# Patient Record
Sex: FEMALE | Race: OTHER | NOT HISPANIC OR LATINO
[De-identification: names, ages, dates, MRNs, and addresses within clinical notes are randomized per-mention and may not be internally consistent; named-entity substitution may affect disease eponyms.]

---

## 2017-01-24 ENCOUNTER — APPOINTMENT (OUTPATIENT)
Dept: PULMONOLOGY | Facility: CLINIC | Age: 77
End: 2017-01-24

## 2017-01-24 VITALS
BODY MASS INDEX: 25.6 KG/M2 | OXYGEN SATURATION: 96 % | DIASTOLIC BLOOD PRESSURE: 78 MMHG | HEART RATE: 69 BPM | HEIGHT: 59 IN | WEIGHT: 127 LBS | SYSTOLIC BLOOD PRESSURE: 128 MMHG

## 2017-01-24 DIAGNOSIS — R05 COUGH: ICD-10-CM

## 2017-01-24 DIAGNOSIS — J45.909 UNSPECIFIED ASTHMA, UNCOMPLICATED: ICD-10-CM

## 2017-01-29 PROBLEM — J45.909 AIRWAY HYPERREACTIVITY: Status: ACTIVE | Noted: 2017-01-29

## 2017-07-11 ENCOUNTER — APPOINTMENT (OUTPATIENT)
Dept: PULMONOLOGY | Facility: CLINIC | Age: 77
End: 2017-07-11

## 2018-12-13 ENCOUNTER — EMERGENCY (EMERGENCY)
Facility: HOSPITAL | Age: 78
LOS: 1 days | Discharge: HOME | End: 2018-12-13
Attending: EMERGENCY MEDICINE | Admitting: EMERGENCY MEDICINE
Payer: COMMERCIAL

## 2018-12-13 VITALS
TEMPERATURE: 99 F | DIASTOLIC BLOOD PRESSURE: 73 MMHG | HEART RATE: 81 BPM | OXYGEN SATURATION: 100 % | SYSTOLIC BLOOD PRESSURE: 155 MMHG | RESPIRATION RATE: 16 BRPM

## 2018-12-13 DIAGNOSIS — Z90.49 ACQUIRED ABSENCE OF OTHER SPECIFIED PARTS OF DIGESTIVE TRACT: Chronic | ICD-10-CM

## 2018-12-13 LAB
ALBUMIN SERPL ELPH-MCNC: 4.2 G/DL — SIGNIFICANT CHANGE UP (ref 3.3–5)
ALP SERPL-CCNC: 53 U/L — SIGNIFICANT CHANGE UP (ref 40–120)
ALT FLD-CCNC: 13 U/L — SIGNIFICANT CHANGE UP (ref 4–33)
AST SERPL-CCNC: 20 U/L — SIGNIFICANT CHANGE UP (ref 4–32)
BASOPHILS # BLD AUTO: 0.07 K/UL — SIGNIFICANT CHANGE UP (ref 0–0.2)
BASOPHILS NFR BLD AUTO: 1 % — SIGNIFICANT CHANGE UP (ref 0–2)
BILIRUB SERPL-MCNC: 0.4 MG/DL — SIGNIFICANT CHANGE UP (ref 0.2–1.2)
BUN SERPL-MCNC: 8 MG/DL — SIGNIFICANT CHANGE UP (ref 7–23)
CALCIUM SERPL-MCNC: 9 MG/DL — SIGNIFICANT CHANGE UP (ref 8.4–10.5)
CHLORIDE SERPL-SCNC: 95 MMOL/L — LOW (ref 98–107)
CO2 SERPL-SCNC: 26 MMOL/L — SIGNIFICANT CHANGE UP (ref 22–31)
CREAT SERPL-MCNC: 0.58 MG/DL — SIGNIFICANT CHANGE UP (ref 0.5–1.3)
EOSINOPHIL # BLD AUTO: 0.24 K/UL — SIGNIFICANT CHANGE UP (ref 0–0.5)
EOSINOPHIL NFR BLD AUTO: 3.5 % — SIGNIFICANT CHANGE UP (ref 0–6)
GLUCOSE SERPL-MCNC: 112 MG/DL — HIGH (ref 70–99)
HCT VFR BLD CALC: 40.1 % — SIGNIFICANT CHANGE UP (ref 34.5–45)
HGB BLD-MCNC: 13.8 G/DL — SIGNIFICANT CHANGE UP (ref 11.5–15.5)
IMM GRANULOCYTES # BLD AUTO: 0.01 # — SIGNIFICANT CHANGE UP
IMM GRANULOCYTES NFR BLD AUTO: 0.1 % — SIGNIFICANT CHANGE UP (ref 0–1.5)
LYMPHOCYTES # BLD AUTO: 2.46 K/UL — SIGNIFICANT CHANGE UP (ref 1–3.3)
LYMPHOCYTES # BLD AUTO: 35.8 % — SIGNIFICANT CHANGE UP (ref 13–44)
MCHC RBC-ENTMCNC: 30.3 PG — SIGNIFICANT CHANGE UP (ref 27–34)
MCHC RBC-ENTMCNC: 34.4 % — SIGNIFICANT CHANGE UP (ref 32–36)
MCV RBC AUTO: 87.9 FL — SIGNIFICANT CHANGE UP (ref 80–100)
MONOCYTES # BLD AUTO: 0.62 K/UL — SIGNIFICANT CHANGE UP (ref 0–0.9)
MONOCYTES NFR BLD AUTO: 9 % — SIGNIFICANT CHANGE UP (ref 2–14)
NEUTROPHILS # BLD AUTO: 3.48 K/UL — SIGNIFICANT CHANGE UP (ref 1.8–7.4)
NEUTROPHILS NFR BLD AUTO: 50.6 % — SIGNIFICANT CHANGE UP (ref 43–77)
NRBC # FLD: 0 — SIGNIFICANT CHANGE UP
PLATELET # BLD AUTO: 255 K/UL — SIGNIFICANT CHANGE UP (ref 150–400)
PMV BLD: 10.2 FL — SIGNIFICANT CHANGE UP (ref 7–13)
POTASSIUM SERPL-MCNC: 4.2 MMOL/L — SIGNIFICANT CHANGE UP (ref 3.5–5.3)
POTASSIUM SERPL-SCNC: 4.2 MMOL/L — SIGNIFICANT CHANGE UP (ref 3.5–5.3)
PROT SERPL-MCNC: 7.8 G/DL — SIGNIFICANT CHANGE UP (ref 6–8.3)
RBC # BLD: 4.56 M/UL — SIGNIFICANT CHANGE UP (ref 3.8–5.2)
RBC # FLD: 11.9 % — SIGNIFICANT CHANGE UP (ref 10.3–14.5)
SODIUM SERPL-SCNC: 130 MMOL/L — LOW (ref 135–145)
WBC # BLD: 6.88 K/UL — SIGNIFICANT CHANGE UP (ref 3.8–10.5)
WBC # FLD AUTO: 6.88 K/UL — SIGNIFICANT CHANGE UP (ref 3.8–10.5)

## 2018-12-13 PROCEDURE — 99285 EMERGENCY DEPT VISIT HI MDM: CPT

## 2018-12-13 PROCEDURE — 70450 CT HEAD/BRAIN W/O DYE: CPT | Mod: 26

## 2018-12-13 NOTE — ED ADULT NURSE NOTE - OBJECTIVE STATEMENT
Patient to room 29 with c/o diplopia and headache as well as left sided face numbness. Pt sent for evaluation/MRI? by her MD. Patient waiting for further evaluation. IVL placed to right AC 20 gauge and labs drawn and sent. Pt waiting for further orders, results, test and dispo.   STANLEY Beckham

## 2018-12-13 NOTE — ED PROVIDER NOTE - CARE PROVIDER_API CALL
Ezra Ceballos (DO), Neurology; Vascular Neurology  [address] [facility] [address]  [place] [address]  Phone: [phone]  Fax: [phone]

## 2018-12-13 NOTE — ED PROVIDER NOTE - PROGRESS NOTE DETAILS
Paged neurology. Awaiting callback. Spoke with neurology. Recommend CTA head and neck and will evaluate patient in ED. Patient seen by neurology. CTA neg for acute pathology. Patient to follow up as outpatient for further testing.

## 2018-12-13 NOTE — ED PROVIDER NOTE - OBJECTIVE STATEMENT
79 yo F, accompanied by , w/ PMH of Glaucoma on Timolol, presenting at request of her ENT dt/ diplopia. 79 yo F, accompanied by , w/ PMH of Glaucoma on Timolol, presenting at request of her ENT dt/ diplopia. Patient began noticing double vision 5 days ago, only when opening her L eye.  also noticed that L eye seemed off center. This was associated with a headache, that radiated from the forehead over the crown to the occiput, as well as L facial pain. She saw her PMD one week ago, who diagnosed her with a sinus infection. Because of the double vision, she saw an ophthalmologist, who recommended she see ENT. Saw ENT today, who recommended that she go to [facility] ER for an MRI. Patient endorses intermittent n/v and dizziness over past week, as well as chronic abdominal pain, unchanged from prior. Denies other complaints.     PMD: Dr. Pham Felder  Ophtho: Dr. Gabriele Queen  ENT: Dr. Shamir Jefferson

## 2018-12-13 NOTE — ED PROVIDER NOTE - ATTENDING CONTRIBUTION TO CARE
Patient is a 79 yo F here for double vision.    gaze palsy Patient is a 77 yo F with history of glaucoma here for double vision x 5 days. Patient went to an ophthalmologist who advised her to see ENT and then Neuro. She saw Dr. Shamir Jefferson and was advised to come to the emergency department for evaluation. She c/o mild left sided headache. Double vision is with left eye. Denies fevers, chills. + nausea, + vomiting. No rash. No difficulty with walking or speaking.     VS noted  Gen. no acute distress, Non toxic   HEENT: PERRL, left eye with ? medial gaze palsy, mmm   Lungs: CTAB/L no C/ W /R   CVS: RRR   Abd; Soft non tender, non distended   Ext: no edema  Skin: no rash  Neuro AAOx3, CN 2-12 intact, strength is 5/5 in UE/LE, clear speech  a/p: left eye with medial gaze palsy - will consult Neuro. Patient is not diabetic, no prior episodes.  - Brooks VILLAVICENCIO    gaze palsy

## 2018-12-13 NOTE — ED PROVIDER NOTE - EYES, MLM
Clear bilaterally, pupils equal, round and reactive to light. L eye deviates to L at baseline. Unable to look inferior with L eye when looking Left.

## 2018-12-13 NOTE — ED ADULT NURSE REASSESSMENT NOTE - NS ED NURSE REASSESS COMMENT FT1
report received from STANLEY Finnegan, pt A&Ox3, ambulatory c/o 10/10 headache and double vision. pt endorses double vision not present when left eye is covered. right eye noted to drift to the right when left eye faces forward, family states this is not normal for patient.

## 2018-12-13 NOTE — ED PROVIDER NOTE - MEDICAL DECISION MAKING DETAILS
79 yo F presenting from ENT's office with request for MRI of brain d/t 5 days of diplopia, L eye deviation on exam, Extraocular movements of L eye not intact. Otherwise jeremy vision, but also with headache and L facial pain. Will obtain cbc, cmp, and CT head noncon. Reassess.

## 2018-12-14 VITALS
TEMPERATURE: 97 F | DIASTOLIC BLOOD PRESSURE: 63 MMHG | RESPIRATION RATE: 16 BRPM | HEART RATE: 87 BPM | SYSTOLIC BLOOD PRESSURE: 153 MMHG | OXYGEN SATURATION: 97 %

## 2018-12-14 PROCEDURE — 70498 CT ANGIOGRAPHY NECK: CPT | Mod: 26

## 2018-12-14 PROCEDURE — 70496 CT ANGIOGRAPHY HEAD: CPT | Mod: 26

## 2018-12-14 RX ORDER — ACETAMINOPHEN 500 MG
650 TABLET ORAL ONCE
Qty: 0 | Refills: 0 | Status: COMPLETED | OUTPATIENT
Start: 2018-12-14 | End: 2018-12-14

## 2018-12-14 RX ADMIN — Medication 650 MILLIGRAM(S): at 01:07

## 2018-12-14 NOTE — ED ADULT NURSE REASSESSMENT NOTE - NS ED NURSE REASSESS COMMENT FT1
pt discharged, instructions provided. IV removed. pt ambulated to ED exit without assist accompanied by .

## 2018-12-14 NOTE — CONSULT NOTE ADULT - NSHPATTENDINGPLANDISCUSS_GEN_ALL_CORE
neurology resident as above and CTA head and neck normal and will follow up with private neurologist next week.

## 2018-12-14 NOTE — CONSULT NOTE ADULT - SUBJECTIVE AND OBJECTIVE BOX
HPI:  The patient is a 77 yo with no known PMHx presented with wake up onset of binocular oblique diplopia from 2 days ago. Oblique diplopia, worse on the right gaze, persists on the left gaze but it's less prominent. Have some eye pain in and around th eye with no edema. Eye pain is more severe with eye EOMs.     MEDICATIONS  (STANDING):    MEDICATIONS  (PRN):    PAST MEDICAL & SURGICAL HISTORY:  Glaucoma  S/P cholecystectomy    FAMILY HISTORY:    Allergies    No Known Allergies    Intolerances        SHx - No smoking, No ETOH, No drug abuse    Review of Systems:  CONSTITUTIONAL:  No weight loss, fever, chills, weakness or fatigue.  HEENT:  Eyes:  No visual loss, blurred vision, double vision or yellow sclerae. Ears, Nose, Throat:  No hearing loss, sneezing, congestion, runny nose or sore throat.  CARDIOVASCULAR:  No chest pain, chest pressure or chest discomfort. No palpitations or edema.  GASTROINTESTINAL:  No anorexia, nausea, vomiting or diarrhea. No abdominal pain or blood.  NEUROLOGICAL: See HPI  MUSCULOSKELETAL:  No muscle, back pain, joint pain or stiffness.  PSYCHIATRIC:  No history of depression or anxiety.      Vital Signs Last 24 Hrs  T(C): 36.2 (14 Dec 2018 00:18), Max: 37 (13 Dec 2018 16:05)  T(F): 97.1 (14 Dec 2018 00:18), Max: 98.6 (13 Dec 2018 16:05)  HR: 87 (14 Dec 2018 00:18) (78 - 87)  BP: 153/63 (14 Dec 2018 00:18) (153/60 - 155/73)  BP(mean): --  RR: 16 (14 Dec 2018 00:18) (16 - 16)  SpO2: 97% (14 Dec 2018 00:18) (97% - 100%)    General Exam:   General appearance: No acute distress                   Neurological Exam:  Mental Status: alert, Orientated to self, date and place.    No dysarthria, aphasia or neglect.      Cranial Nerves: CN I - not tested.  PERRL  - Primary gaze, left eye extropia and hypotropia.   - Right gaze, left eye decrease adduction, right eye nystagmus  - left gaze, left eye hypotropia    No facial asymmetry.  Hearing intact to finger rub bilaterally.     Motor:   Tone: normal.                  Strength:     [] Upper extremity                      Delt       Bicep    Tricep                                                  R         5/5        5/5        5/5       5/5                                               L          5/5 5/5        5/5 5/5  [] Lower extremity                       HF          KE          KF        DF         PF                                               R        5/5 5/5 5/5 5/5 5/5                                               L         5/5 5/5 5/5 5/5        5/5  Pronator drift: none                 Dysmetria: BL NL FTN  No truncal ataxia.    Tremor: No resting, postural or action tremor.  No myoclonus.    Sensation: intact to light touch, pinprick, vibration and proprioception    Deep Tendon Reflexes:   Right 2+ : BC, TC, BRD   Left 2+ : BC, TC, BRD  Right 2+ Knee, 1+ ankle  Left 2+ Knee, 1+ ankle    Toes flexor bilaterally  Gait: normal and stable.      Other:    12-13    130<L>  |  95<L>  |  8   ----------------------------<  112<H>  4.2   |  26  |  0.58    Ca    9.0      13 Dec 2018 19:55    TPro  7.8  /  Alb  4.2  /  TBili  0.4  /  DBili  x   /  AST  20  /  ALT  13  /  AlkPhos  53  12-13                          13.8   6.88  )-----------( 255      ( 13 Dec 2018 19:55 )             40.1       Radiology    CT  MRI  EKG:  tele:  TTE:  EEG:

## 2018-12-14 NOTE — CONSULT NOTE ADULT - ASSESSMENT
77 yo with no known PMHx presented with wake up onset of binocular oblique diplopia from 2 days ago. Oblique diplopia, worse on the right gaze, persists on the left gaze but it's less prominent. Have some eye pain in and around th eye with no edema. Eye pain is more severe with eye EOMs. Exam showed left eye extropia and hypotropia and Right gaze, left eye decrease adduction, right eye nystagmus and left gaze, left eye hypotropia.  DDx: III CN palsym supranuclear disorders, ocular neuropathies are within differentials.   Impression: Since she is not diabetic. Would recommend CTA Head or MRA H to R/O pcom aneurysm. CTV or MRV brain to R/O cavernous sinus thrombosis. MRI brain with contrast and clear cuts of orbit to R/O demyelination, ocular myopathies, and III n. infiltrative diseases.  Plan:  [] MRI brain with contrast and clear cuts of orbit  [] CTA- CTV brain or MRA-MRV brain 77 yo with no known PMHx presented with wake up onset of binocular oblique diplopia from 2 days ago. Oblique diplopia, worse on the right gaze, persists on the left gaze but it's less prominent. Have some eye pain in and around th eye with no edema. Eye pain is more severe with eye EOMs. Exam showed left eye extropia and hypotropia and Right gaze, left eye decrease adduction, right eye nystagmus and left gaze, left eye hypotropia.  DDx: III CN palsym supranuclear disorders, ocular neuropathies are within differentials.   Impression: Since she is not diabetic. Would recommend CTA Head or MRA H to R/O pcom aneurysm. CTV or MRV brain to R/O cavernous sinus thrombosis. MRI brain with contrast and clear cuts of orbit to R/O demyelination, ocular myopathies, and III n. infiltrative diseases.  Plan:  [] MRI brain with contrast and clear cuts of orbit  [] CTA- CTV brain or MRA-MRV brain  [] F/U with neurologist as an outpatient Dr. Ceballos [phone] 77 yo with no known PMHx presented with wake up onset of binocular oblique diplopia from 2 days ago. Oblique diplopia, worse on the right gaze, persists on the left gaze but it's less prominent. Have some eye pain in and around th eye with no edema. Eye pain is more severe with eye EOMs. Exam showed left eye extropia and hypotropia and Right gaze, left eye decrease adduction, right eye nystagmus and left gaze, left eye hypotropia.  DDx: III CN palsym supranuclear disorders, ocular neuropathies are within differentials.   Impression: Since she is not diabetic. Would recommend CTA Head or MRA H to R/O pcom aneurysm. CTV or MRV brain to R/O cavernous sinus thrombosis. MRI brain with contrast and clear cuts of orbit to R/O demyelination, ocular myopathies, and III n. infiltrative diseases.  Plan:  [] MRI brain with contrast and clear cuts of orbit  [] CTA- CTV brain or MRA-MRV brain as outpatient   [] F/U with neurologist as an outpatient Dr. Ceballos [phone]

## 2018-12-19 PROBLEM — H40.9 UNSPECIFIED GLAUCOMA: Chronic | Status: ACTIVE | Noted: 2018-12-13

## 2019-01-02 ENCOUNTER — APPOINTMENT (OUTPATIENT)
Dept: OPHTHALMOLOGY | Facility: CLINIC | Age: 79
End: 2019-01-02
Payer: COMMERCIAL

## 2019-01-02 DIAGNOSIS — H49.12 FOURTH [TROCHLEAR] NERVE PALSY, LEFT EYE: ICD-10-CM

## 2019-01-02 DIAGNOSIS — Z86.69 PERSONAL HISTORY OF OTHER DISEASES OF THE NERVOUS SYSTEM AND SENSE ORGANS: ICD-10-CM

## 2019-01-02 PROCEDURE — 99204 OFFICE O/P NEW MOD 45 MIN: CPT

## 2019-01-02 PROCEDURE — 92060 SENSORIMOTOR EXAMINATION: CPT

## 2019-02-13 ENCOUNTER — APPOINTMENT (OUTPATIENT)
Dept: OPHTHALMOLOGY | Facility: CLINIC | Age: 79
End: 2019-02-13
Payer: MEDICARE

## 2019-02-13 PROCEDURE — 92012 INTRM OPH EXAM EST PATIENT: CPT

## 2019-02-13 PROCEDURE — 92060 SENSORIMOTOR EXAMINATION: CPT

## 2022-02-23 NOTE — ED PROVIDER NOTE - NS ED ATTENDING STATEMENT MOD
For information on Fall & Injury Prevention, visit: [email or web address] OR  [email or web address] OR  https://www.cdc.gov/steadi/patient.html I have personally seen and examined this patient.  I have fully participated in the care of this patient. I have reviewed all pertinent clinical information, including history, physical exam, plan and the Resident’s note and agree except as noted.

## 2024-04-03 ENCOUNTER — APPOINTMENT (OUTPATIENT)
Dept: NEUROLOGY | Facility: CLINIC | Age: 84
End: 2024-04-03
Payer: MEDICARE

## 2024-04-03 VITALS
HEIGHT: 59 IN | SYSTOLIC BLOOD PRESSURE: 165 MMHG | BODY MASS INDEX: 25.6 KG/M2 | WEIGHT: 127 LBS | DIASTOLIC BLOOD PRESSURE: 70 MMHG | HEART RATE: 70 BPM

## 2024-04-03 DIAGNOSIS — H49.20 SIXTH [ABDUCENT] NERVE PALSY, UNSPECIFIED EYE: ICD-10-CM

## 2024-04-03 DIAGNOSIS — Z82.49 FAMILY HISTORY OF ISCHEMIC HEART DISEASE AND OTHER DISEASES OF THE CIRCULATORY SYSTEM: ICD-10-CM

## 2024-04-03 PROCEDURE — G2211 COMPLEX E/M VISIT ADD ON: CPT

## 2024-04-03 PROCEDURE — 99205 OFFICE O/P NEW HI 60 MIN: CPT

## 2024-04-03 RX ORDER — BECLOMETHASONE DIPROPIONATE 80 UG/1
80 AEROSOL, METERED RESPIRATORY (INHALATION) TWICE DAILY
Qty: 1 | Refills: 0 | Status: DISCONTINUED | OUTPATIENT
Start: 2017-01-29 | End: 2024-04-03

## 2024-04-03 RX ORDER — ASPIRIN 81 MG
81 TABLET, DELAYED RELEASE (ENTERIC COATED) ORAL
Refills: 0 | Status: ACTIVE | COMMUNITY

## 2024-04-03 RX ORDER — OMEPRAZOLE 20 MG/1
20 TABLET, DELAYED RELEASE ORAL
Refills: 0 | Status: ACTIVE | COMMUNITY

## 2024-04-03 NOTE — DISCUSSION/SUMMARY
[FreeTextEntry1] : She has a right 6th nerve palsy and is otherwise neurologically intact.  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 Giant cell arteritis, Myasthenia Gravis and thyroid ophthalmopathy are also possible, though seemingly less likely. I have requested an MRI brain, MRA head and neck, ESR/CRP to screen for temporal arteritis, as well as acetylcholine receptor antibodies and TFTs. She should continue to follow up with Dr. Hernandez to screen for other pathology and for further workup.  She should benefit from aggressive vascular risk factor control, including BP which was elevated in the office today.  She can follow up in 2 months. I hope she remains free of further serious trouble.

## 2024-04-03 NOTE — HISTORY OF PRESENT ILLNESS
[FreeTextEntry1] : She is an 83 year old right handed lady.  She presents to the office today with her . On 3/28/24, she began to notice double vision. She also noted right eye pain at the time. She denies any other focal neurologic symptoms. She was evaluated by Dr. Hernandez (ophthalmology) and he noted a right 6th nerve palsy.   PCP Dr. Yemi Morales ([facility]) Ophthalmologist Dr. Jb Hernandez ([facility])

## 2024-04-03 NOTE — PHYSICAL EXAM
[General Appearance - Alert] : alert [General Appearance - In No Acute Distress] : in no acute distress [Oriented To Time, Place, And Person] : oriented to person, place, and time [Impaired Insight] : insight and judgment were intact [Affect] : the affect was normal [Sclera] : the sclera and conjunctiva were normal [Full Visual Field] : full visual field [Examination Of The Oral Cavity] : the lips and gums were normal [Outer Ear] : the ears and nose were normal in appearance [Neck Appearance] : the appearance of the neck was normal [Neck Cervical Mass (___cm)] : no neck mass was observed [Heart Sounds] : normal S1 and S2 [Auscultation Breath Sounds / Voice Sounds] : lungs were clear to auscultation bilaterally [Abnormal Walk] : normal gait [Musculoskeletal - Swelling] : no joint swelling seen [Nail Clubbing] : no clubbing  or cyanosis of the fingernails [Motor Tone] : muscle strength and tone were normal [Skin Color & Pigmentation] : normal skin color and pigmentation [Skin Turgor] : normal skin turgor [] : no rash [FreeTextEntry1] : Neurologic examination:  Mental status:  The patient is alert, attentive, and oriented. Speech is clear and fluent with good comprehension. Cranial nerves:  CN II: Visual fields are full to confrontation. Pupils are 4 mm and briskly reactive to light. bilaterally.  CN III, IV, VI: At primary gaze, there is no eye deviation. There is a right 6th nerve palsy; otherwise, EOMI. Mild left ptosis.  CN V: Facial sensation is intact bilaterally.  CN VII: Face is symmetric with normal eye closure and smile.  CN VII: Hearing is grossly intact.  CN IX, X: Palate elevates symmetrically. Phonation is normal.  CN XI: Head turning and shoulder shrug are intact  CN XII: Tongue is midline with normal movements and no atrophy.  Motor:  There is no pronator drift of out-stretched arms. Muscle bulk and tone are normal. Strength is full bilaterally.  Sensory:  Light touch intact in fingers and toes.  Coordination:  Fine finger movements are intact. There is no dysmetria on finger-to-nose.   Gait/Stance:  Gait is normal. Mild difficulty with tandem gait. Romberg is absent.

## 2024-04-03 NOTE — END OF VISIT
[Time Spent: ___ minutes] : I have spent [unfilled] minutes of time on the encounter.
Alert-The patient is alert, awake and responds to voice. The patient is oriented to time, place, and person. The triage nurse is able to obtain subjective information.

## 2024-04-04 ENCOUNTER — NON-APPOINTMENT (OUTPATIENT)
Age: 84
End: 2024-04-04

## 2024-05-06 ENCOUNTER — NON-APPOINTMENT (OUTPATIENT)
Age: 84
End: 2024-05-06

## 2024-05-10 ENCOUNTER — APPOINTMENT (OUTPATIENT)
Dept: OPHTHALMOLOGY | Facility: CLINIC | Age: 84
End: 2024-05-10
Payer: MEDICARE

## 2024-05-10 ENCOUNTER — NON-APPOINTMENT (OUTPATIENT)
Age: 84
End: 2024-05-10

## 2024-05-10 PROCEDURE — 92133 CPTRZD OPH DX IMG PST SGM ON: CPT

## 2024-05-10 PROCEDURE — 92083 EXTENDED VISUAL FIELD XM: CPT

## 2024-05-10 PROCEDURE — 99204 OFFICE O/P NEW MOD 45 MIN: CPT

## 2024-05-13 LAB
ACRM BINDING ANTIBODY: <0.03 NMOL/L
CRP SERPL-MCNC: <3 MG/L
ERYTHROCYTE [SEDIMENTATION RATE] IN BLOOD BY WESTERGREN METHOD: 10 MM/HR
T4 SERPL-MCNC: 8.9 UG/DL
TSH SERPL-ACNC: 1.72 UIU/ML

## 2024-05-14 LAB — ACHR BLOCK AB SER QL: 22 %

## 2024-05-21 ENCOUNTER — NON-APPOINTMENT (OUTPATIENT)
Age: 84
End: 2024-05-21

## 2024-05-21 LAB — ACHR MOD AB SER-ACNC: 1 %

## 2024-06-03 ENCOUNTER — APPOINTMENT (OUTPATIENT)
Dept: NEUROLOGY | Facility: CLINIC | Age: 84
End: 2024-06-03